# Patient Record
Sex: FEMALE | Employment: FULL TIME | URBAN - METROPOLITAN AREA
[De-identification: names, ages, dates, MRNs, and addresses within clinical notes are randomized per-mention and may not be internally consistent; named-entity substitution may affect disease eponyms.]

---

## 2017-09-21 ENCOUNTER — HOSPITAL ENCOUNTER (OUTPATIENT)
Dept: GENERAL RADIOLOGY | Facility: HOSPITAL | Age: 32
Discharge: HOME | End: 2017-09-21
Attending: OBSTETRICS & GYNECOLOGY
Payer: COMMERCIAL

## 2017-09-21 DIAGNOSIS — N97.9 INFERTILITY, FEMALE: ICD-10-CM

## 2017-09-21 PROCEDURE — 74740 X-RAY FEMALE GENITAL TRACT: CPT | Performed by: OBSTETRICS & GYNECOLOGY

## 2017-09-21 PROCEDURE — 58340 CATHETER FOR HYSTEROGRAPHY: CPT | Performed by: OBSTETRICS & GYNECOLOGY

## 2020-08-18 ENCOUNTER — APPOINTMENT (OUTPATIENT)
Dept: CT IMAGING | Facility: HOSPITAL | Age: 35
DRG: 439 | End: 2020-08-18
Attending: EMERGENCY MEDICINE
Payer: COMMERCIAL

## 2020-08-18 PROBLEM — K85.20 ALCOHOL-INDUCED ACUTE PANCREATITIS: Status: ACTIVE | Noted: 2020-08-18

## 2020-08-18 PROBLEM — K85.20 ALCOHOL-INDUCED ACUTE PANCREATITIS, UNSPECIFIED COMPLICATION STATUS: Status: ACTIVE | Noted: 2020-08-18

## 2020-08-18 PROBLEM — K85.20 ALCOHOL-INDUCED ACUTE PANCREATITIS (HCC): Status: ACTIVE | Noted: 2020-08-18

## 2020-08-18 PROBLEM — K85.20 ALCOHOL-INDUCED ACUTE PANCREATITIS, UNSPECIFIED COMPLICATION STATUS (HCC): Status: ACTIVE | Noted: 2020-08-18

## 2020-08-18 PROCEDURE — 74176 CT ABD & PELVIS W/O CONTRAST: CPT | Performed by: EMERGENCY MEDICINE

## 2020-08-19 NOTE — PLAN OF CARE
Problem: Patient Centered Care  Goal: Patient preferences are identified and integrated in the patient's plan of care  Description  Interventions:  - What would you like us to know as we care for you?  \"I have always drank a lot, but I guess the amount h FALL  Goal: Free from fall injury  Description  INTERVENTIONS:  - Assess pt frequently for physical needs  - Identify cognitive and physical deficits and behaviors that affect risk of falls.   - [place] fall precautions as indicated by assessment.  - Educ and temperature  - Assess for signs of decreased coronary artery perfusion - ex.  Angina  - Evaluate fluid balance, assess for edema, trend weights  Outcome: Progressing  Goal: Absence of cardiac arrhythmias or at baseline  Description  INTERVENTIONS:  - Co adequate hydration with IV or PO as ordered and tolerated  - Evaluate effectiveness of GI medications  - Encourage mobilization and activity  - Obtain nutritional consult as needed  - Establish a toileting routine/schedule  - Consider collaborating with ph remains intact  Description  INTERVENTIONS  - Assess and document risk factors for pressure ulcer development  - Assess and document skin integrity  - Monitor for areas of redness and/or skin breakdown  - Initiate interventions, skin care algorithm/standar neurological status  - Encourage and assist patient to increase activity and self care with guidance from PT/OT  - Encourage visually impaired, hearing impaired and aphasic patients to use assistive/communication devices  Outcome: Progressing     Problem:

## 2020-08-19 NOTE — H&P
[facility] Hospitalist History and Physical      Patient presents with:  Abdomen/Flank Pain       PCP: Juvenal Devine      History of Present Illness: Patient is a 28year old female with no sig pmh presents with a cc of abd pain.   Pain started ystd, it was mostl Septum midline. Mucosa normal. No drainage.    Throat: Lips, mucosa, and tongue normal. Teeth and gums normal.   Neck: Supple, symmetrical, trachea midline, no cervical or supraclavicular lymph adenopathy, thyroid: no enlargment/tenderness/nodules appreciat transmitted to the [facility]) Jerald Issa [address] ([address]), which includes the Dose Index Registry.    FINDINGS: COMMENT: Evaluation of the vasculature and of the abdominal viscera for the presence of intraparenchymal pat a 28year old female with no sig pmh presents with a cc of abd pain. Pain started ystd, it was mostly epigastric, radiated to the back, sharp, severe, w/o clear exacerbating or alleviating factors.     Problem List:  Acute alcoholic pancreatitis   etoh dep

## 2020-08-19 NOTE — CONSULTS
[facility]    Report of Consultation    Tanesha Almeida Patient Status:  Inpatient    2/10/1985 MRN [id number]   Location [facility] 5SW/SE Attending Alejandra Lentz MD   Hosp Day # 1 PCP Eneida Gilmore     Date of Admission:   B-1) tab 100 mg, 100 mg, Oral, Daily  multivitamin with minerals (ADULT) tab 1 tablet, 1 tablet, Oral, Daily  folic acid (FOLVITE) tab 1 mg, 1 mg, Oral, Daily  Ketorolac Tromethamine (TORADOL) injection 15 mg, 15 mg, Intravenous, Q6H PRN  Enoxaparin Sodium 116*   CA 9.0 7.6*   ALB 4.1 2.9*   ALKPHO 90 64   TP 7.8 5.8*   AST 50* 34   ALT 38 25       Recent Labs   Lab 08/18/20  1838 08/19/20  0653   LIP 9,287* 5,367*   ETOH <3  --         Imaging:  Ct Abdomen+pelvis Kidneystone 2d Rndr(no Iv,no Oral)(cpt=74978

## 2020-08-19 NOTE — PAYOR COMM NOTE
--------------  ADMISSION REVIEW     Payor: [address]  Subscriber #:  [id number]  Authorization Number: [id number]     Admit date: 8/18/20  Admit time: 2138       Admitting Physician: Aliya Escalera MD  Attending Physician:  Jamilah Rodgers MD  [facility] rash.   Neurological: Negative for dizziness. Psychiatric/Behavioral: Negative for suicidal ideas. All other systems reviewed and are negative. Positive for stated complaint: abd/back pain  Other systems are as noted in HPI.   Constitutional and vi Value Ref Range    Hold Lavender Auto Resulted    RAINBOW DRAW LIGHT GREEN    Collection Time: 08/18/20  6:38 PM   Result Value Ref Range    Hold Lt Green Auto Resulted    RAINBOW DRAW GOLD    Collection Time: 08/18/20  6:38 PM   Result Value Ref Range Neutrophil % 76.1 %    Lymphocyte % 16.0 %    Monocyte % 6.3 %    Eosinophil % 0.5 %    Basophil % 0.8 %    Immature Granulocyte % 0.3 %   ETHYL ALCOHOL    Collection Time: 08/18/20  6:38 PM   Result Value Ref Range    Ethyl Alcohol <3 <3 mg/dL   [facility] POCT admission  - discussed with Dr. Anne Mahoney - informed him of pt consult - requesting LR @150/hr        Medical Record Review: I personally reviewed available prior medical records for any recent pertinent discharge summaries, testing, and procedures, and review pain/sob/focal neuro deficits. Found to have acute pancreatitis on admit to the hospital - lipase in the 9000's with saran-pancreatic stranding/inflammation on CT.   Patient states that she is a daily drinker - up to 6 hard alcoholic drinks daily, more on t S2 normal, no murmur, rub or gallop appreciated   Abdomen:   Soft, mild ttp - worse in epigastrium - non-peritoneal. Bowel sounds normal. No masses,  No organomegaly. Non distended   Extremities: Extremities normal, atraumatic, no cyanosis or edema.    Skin LIVER: There is mild hepatic steatosis. BILIARY: The gallbladder is nondilated. There is no biliary ductal dilatation.  PANCREAS: There is diffuse peripancreatic fat stranding with edema extending into the right anterior pararenal space and root of the sma imaging  -CIWA  -Vitamins  -seizure precautions  -educated on etoh cessation  -GI on consult    Karen Ho MD  [facility] Hospitalist  [phone]          Electronically signed by Cherylene Shiley, MD on 8/19/2020 10:09 AM         MEDICATIONS ADMINISTERED IN LAST Renu Montgomery RN    8/19/2020 9368 Given 2 mg Intravenous Mindy REAGAN Bran    8/19/2020 0122 Given 2 mg Intravenous Mindy REAGAN Bran    8/18/2020 2239 Given 2 mg Intravenous Mindy Bran RN      morphINE sulfate (PF) 4 MG/ML inject

## 2020-08-19 NOTE — ED PROVIDER NOTES
Patient Seen in: [facility] AND [facility] Emergency Department      History   Patient presents with:  Abdomen/Flank Pain    Stated Complaint: abd/back pain    HPI    66-year-old female with past medical history here with complaints of generalized abdominal pain Temp 97.9 °F (36.6 °C) (Temporal)   Resp 18   Ht 160 cm (5' 3\")   Wt 59 kg   SpO2 98%   BMI 23.03 kg/m²         Physical Exam  Vitals signs and nursing note reviewed.    Constitutional:       Comments: Appears uncomfortable, writhing in cart   HENT: 1.02 mg/dL    BUN/CREA Ratio 8.1 (L) 10.0 - 20.0    Calcium, Total 9.0 8.5 - 10.1 mg/dL    Calculated Osmolality 292 275 - 295 mOsm/kg    GFR, Non- 74 >=60    GFR, -American 85 >=60    ALT 38 13 - 56 U/L    AST 50 (H) 15 - 37 U/L Urine Color Yellow Yellow    Clarity Urine Hazy (A) Clear    Spec Gravity 1.012 1.002 - 1.035    Glucose Urine 50  (A) Negative mg/dL    Bilirubin Urine Negative Negative    Ketones Urine Negative Negative mg/dL    Blood Urine Negative Negative    pH Urine based on the presenting problem including pancreatitis, nephrolithiasis, diverticulitis, perforated appendicitis.       Disposition and Plan     Clinical Impression:  Alcohol-induced acute pancreatitis, unspecified complication status  (primary encounter di

## 2020-08-19 NOTE — BH PROGRESS NOTE
[facility]    Garcia Postin Patient Status:  Inpatient    2/10/1985 MRN [id number]   Location [facility] 5SW/SE Attending Kneny Underwood MD   [facility] Day # 1 PCP Cecy NINO(subjective) \"I've been using alcohol as a way to for LOMG/URVASHI outpatient programming. Psych Liaison will provide additional CADC and LMFT referrals in her discharge instructions. Case discussed with REAGAN Martínez Postal face to face following consult.        Kenna Knutson, [address]  8/19/2020  3:54 PM

## 2020-08-19 NOTE — ED NOTES
Orders for admission, patient is aware of plan and ready to go upstairs. Any questions, please call ED RN uDsty  at extension [phone]. Pt is a&o x4. Ambulatory and independent. Pt has been NPO since arrival to er.

## 2020-08-19 NOTE — ED NOTES
Pt sts, \"I've been drinking Fireball whiskey and maybe a few beers every day. I don't know how many days I've been drinking straight. My last drink was 9pm last night. \"

## 2020-08-19 NOTE — ED NOTES
Pt currently unable to give urine specimen. Pt crying out complaining of severe abdominal pain that wraps around R flank. +N/V. Mother at bedside.

## 2020-08-20 NOTE — PAYOR COMM NOTE
--------------  CONTINUED STAY REVIEW    Payor: [facility]  Subscriber #:  [id number]  Authorization Number: [id number]     Admit date: 8/18/20  Admit time: 2138    Admitting Physician: Golden Wu MD  Attending Physician:  Tc Multani MD  Pr Intravenous, Q8H PRN  Thiamine HCl (Vitamin B-1) tab 100 mg, 100 mg, Oral, Daily  multivitamin with minerals (ADULT) tab 1 tablet, 1 tablet, Oral, Daily  folic acid (FOLVITE) tab 1 mg, 1 mg, Oral, Daily  Ketorolac Tromethamine (TORADOL) injection 15 mg, 15 CREATSERUM 0.99 0.67   BUN 8 8    138   K 3.3* 3.8    107   CO2 20.0* 28.0   * 116*   CA 9.0 7.6*   ALB 4.1 2.9*   ALKPHO 90 64   TP 7.8 5.8*   AST 50* 34   ALT 38 25              Recent Labs   Lab 08/18/20  1838 08/19/20  0653   LIP 9 mucosa, and tongue normal. Teeth and gums normal.   Neck: Supple, symmetrical, trachea midline, no cervical or supraclavicular lymph adenopathy, thyroid: no enlargment/tenderness/nodules appreciated   Lungs:   Clear to auscultation bilaterally.  Normal effo exacerbating or alleviating factors.     Problem List:  Acute alcoholic pancreatitis   etoh dependence  etoh abuse  etoh withdrawal     Plan:  -NPO - diet per GI  -IVF  -Pain control  -monitor labs  -No evidence of necrosis/abscess on imaging - keep a clos Bag (none) Intravenous Mckenzie Garza, REAGAN    8/19/2020 1427 Rate/Dose Change (none) Intravenous Adonay Alberto, RN      LORazepam (ATIVAN) injection 1 mg     Date Action Dose Route User    8/20/2020 2137 Given 1 mg Intravenous Leodan Brown

## 2020-08-20 NOTE — DIETARY NOTE
ADULT NUTRITION INITIAL ASSESSMENT    RECOMMENDATIONS TO MD:  CPM  Recommend diet advancement as medically safe and or Nutrition Support if NPO status >7-10 days. Consider  Mg and Phos level eval, replete if deficient. Discussed with RN.    Pt is at [facility] 8549-2354 calories/day (28- 30 calories per kg Actual body wt (ABW))    Protein: 70-75 g protein/day (1.2-1.3 g protein/kg  Actual body wt (ABW))  - Diet: NPO  - Meals and snacks: NPO  - Medical Food or Oral Nutrition Supplements (ONS) -NPO  - Vitamin and function, labs WNL, maintain true wt within 5% and abstinence from alcohol. RD will follow up.      Milla Newberry, [address] [place] [address], [address] [place] [address]        Clinical Dietitian  [phone]

## 2020-08-20 NOTE — PROGRESS NOTES
[facility] Hospitalist Progress Note                                                                   [address]   2/10/1985    SUBJECTIVE:  Had some severe pain overnight, a little better t No results for input(s): PGLU in the last 168 hours.     Meds:   Scheduled:   • Thiamine HCl  100 mg Oral Daily   • multivitamin with minerals  1 tablet Oral Daily   • folic acid  1 mg Oral Daily   • enoxaparin  40 mg Subcutaneous Daily     Continuous

## 2020-08-20 NOTE — PROGRESS NOTES
[facility] AND [facility]  Gastroenterology Progress Note    Rosales Rosales Patient Status:  Inpatient    2/10/1985 MRN [id number]   Location [facility] 5SW/SE Attending Johann Velarde, [address] [facility] Day # 2 PCP Jamil Amezquita     Subjective:  Mauricio Mills normal.   -hepatitis A and C negative  -hepatitis b surface antibody positive suggestive of previous vaccination and immunity.      3. Alcohol abuse.    -no history of alcohol withdrawal, however she is at higher risk for this.    -no evidence of overt wit

## 2020-08-20 NOTE — PLAN OF CARE
Problem: Patient Centered Care  Goal: Patient preferences are identified and integrated in the patient's plan of care  Description  Interventions:  - What would you like us to know as we care for you?   - Provide timely, complete, and accurate informatio Problem: GASTROINTESTINAL - ADULT  Goal: Minimal or absence of nausea and vomiting  Description  INTERVENTIONS:  - Maintain adequate hydration with IV or PO as ordered and tolerated  - Nasogastric tube to low intermittent suction as ordered  - Evaluate e activity, document and report duration and description of seizure to MD/LIP  - If seizure occurs, turn patient to side and suction secretions as needed  - Reorient patient post seizure  - Seizure pads on all 4 side rails  - Instruct patient/family to notif

## 2020-08-20 NOTE — PLAN OF CARE
Problem: Patient Centered Care  Goal: Patient preferences are identified and integrated in the patient's plan of care  Description  Interventions:  - What would you like us to know as we care for you? I want to be pain free.   - Provide timely, complete, patient/family/discharge partner in discharge planning  - Arrange for needed discharge resources and transportation as appropriate  - Identify discharge learning needs (meds, wound care, etc)  - Arrange for interpreters to assist at discharge as needed  - Position to facilitate oxygenation and minimize respiratory effort  - Oxygen supplementation based on oxygen saturation or ABGs  - Provide Smoking Cessation handout, if applicable  - Encourage broncho-pulmonary hygiene including cough, deep breathe, Incent blood pressure (other measures as available)  - Encourage oral intake as appropriate  - Instruct patient on fluid and nutrition restrictions as appropriate  Outcome: Progressing     Problem: SKIN/TISSUE INTEGRITY - ADULT  Goal: Skin integrity remains intac to call for assistance with activity based on assessment  Outcome: Progressing  Goal: Achieves maximal functionality and self care  Description  INTERVENTIONS  - Monitor swallowing and airway patency with patient fatigue and changes in neurological status

## 2020-08-21 ENCOUNTER — APPOINTMENT (OUTPATIENT)
Dept: CT IMAGING | Facility: HOSPITAL | Age: 35
DRG: 439 | End: 2020-08-21
Attending: HOSPITALIST
Payer: COMMERCIAL

## 2020-08-21 PROCEDURE — 74177 CT ABD & PELVIS W/CONTRAST: CPT | Performed by: HOSPITALIST

## 2020-08-21 NOTE — PLAN OF CARE
Problem: Patient Centered Care  Goal: Patient preferences are identified and integrated in the patient's plan of care  Description  Interventions:  - What would you like us to know as we care for you?  I live at home with my   - Provide timely, com injury  Description  INTERVENTIONS:  - Assess pt frequently for physical needs  - Identify cognitive and physical deficits and behaviors that affect risk of falls.   - [place] fall precautions as indicated by assessment.  - Educate pt/family on patient sa for signs of decreased coronary artery perfusion - ex.  Angina  - Evaluate fluid balance, assess for edema, trend weights  Outcome: Progressing  Goal: Absence of cardiac arrhythmias or at baseline  Description  INTERVENTIONS:  - Continuous cardiac monitorin as ordered and tolerated  - Evaluate effectiveness of GI medications  - Encourage mobilization and activity  - Obtain nutritional consult as needed  - Establish a toileting routine/schedule  - Consider collaborating with pharmacy to review patient's medica intact  Description  INTERVENTIONS  - Assess and document risk factors for pressure ulcer development  - Assess and document skin integrity  - Monitor for areas of redness and/or skin breakdown  - Initiate interventions, skin care algorithm/standards of ca status  - Encourage and assist patient to increase activity and self care with guidance from PT/OT  - Encourage visually impaired, hearing impaired and aphasic patients to use assistive/communication devices  Outcome: Progressing     Problem: ANXIETY  Goal

## 2020-08-21 NOTE — PLAN OF CARE
Problem: Patient Centered Care  Goal: Patient preferences are identified and integrated in the patient's plan of care  Description  Interventions:  - What would you like us to know as we care for you?  I live at home with my   - Provide timely, com injury  Description  INTERVENTIONS:  - Assess pt frequently for physical needs  - Identify cognitive and physical deficits and behaviors that affect risk of falls.   - [place] fall precautions as indicated by assessment.  - Educate pt/family on patient sa for signs of decreased coronary artery perfusion - ex.  Angina  - Evaluate fluid balance, assess for edema, trend weights  Outcome: Progressing  Goal: Absence of cardiac arrhythmias or at baseline  Description  INTERVENTIONS:  - Continuous cardiac monitorin as ordered and tolerated  - Evaluate effectiveness of GI medications  - Encourage mobilization and activity  - Obtain nutritional consult as needed  - Establish a toileting routine/schedule  - Consider collaborating with pharmacy to review patient's medica intact  Description  INTERVENTIONS  - Assess and document risk factors for pressure ulcer development  - Assess and document skin integrity  - Monitor for areas of redness and/or skin breakdown  - Initiate interventions, skin care algorithm/standards of ca status  - Encourage and assist patient to increase activity and self care with guidance from PT/OT  - Encourage visually impaired, hearing impaired and aphasic patients to use assistive/communication devices  Outcome: Progressing     Problem: ANXIETY  Goal

## 2020-08-21 NOTE — PROGRESS NOTES
[facility] AND [facility]  Gastroenterology Progress Note    Mona Schwartz Patient Status:  Inpatient    2/10/1985 MRN [id number]   Location [facility] 5SW/SE Attending Mario Riley, [address] [facility] Day # 3 PCP Isaak Matt     Subjective:  [place]iam pancreatitis  -history of alcohol abuse.   -no obvious stones on noncontrast CT.   -still some pain today, however improved and she is feeling hungry     2.   Abnormal LFTs - resolved  -likely secondary to mild alcohol hepatitis  -now LFTs normal.   -hepati

## 2020-08-21 NOTE — PROGRESS NOTES
[facility] Hospitalist Progress Note                                                                   [address]   2/10/1985    SUBJECTIVE:  Spiked a temp today and persistently tachycardic, --  0.57   CA 9.0 7.6* 7.7*  --  7.3*   MG  --   --  1.4*  --  2.3   PHOS  --   --  1.5* 2.6  --    * 116* 77  --  67*       Recent Labs   Lab 08/18/20  1838 08/19/20  0653   ALT 38 25   AST 50* 34   ALB 4.1 2.9*       Recent Labs   Lab 08/21/20  08

## 2020-08-21 NOTE — PAYOR COMM NOTE
--------------  CONTINUED STAY REVIEW    Payor: [address] [facility]  Subscriber #:  [id number]  Authorization Number: [id number]     Admit date: 8/18/20  Admit time: 2138    FAXING CLINICAL UPDATE FOR 8/21/20 8/21/20   GASTROENTEROLOGY NOTE:  Gerardo Acuña MEDICATIONS ADMINISTERED IN LAST 1 DAY:  dextrose 50 % injection     Date Action Dose Route User    8/21/2020 0845 Given (none) (none) Hector Mendoza RN      Enoxaparin Sodium (LOVENOX) 40 MG/0.4ML injection 40 mg     Date Action Dose Route sodium chloride 0.9% 250 mL IVPB     Date Action Dose Route User    8/20/2020 1614 New Bag 40 mEq Intravenous Viki Jaramillo, REAGAN      multivitamin with minerals (ADULT) tab 1 tablet     Date Action Dose Route User    8/21/2020 7089 Given 1 tablet Or

## 2020-08-22 ENCOUNTER — APPOINTMENT (OUTPATIENT)
Dept: GENERAL RADIOLOGY | Facility: HOSPITAL | Age: 35
DRG: 439 | End: 2020-08-22
Attending: HOSPITALIST
Payer: COMMERCIAL

## 2020-08-22 PROCEDURE — 71045 X-RAY EXAM CHEST 1 VIEW: CPT | Performed by: HOSPITALIST

## 2020-08-22 NOTE — PROGRESS NOTES
[facility]  Gastroenterology Progress Note    Zari Koo Patient Status:  Inpatient    2/10/1985 MRN [id number]   Location [facility] 5SW/SE Attending Jhonny Mera, [address] [facility] Day # 4 PCP Kimberley Chauhan     Subjective:  Juvenal Car collection/pseudocyst. 2. Development of moderate pleural effusions bilaterally with associated passive atelectasis at both lung bases.  3. Mild localized small bowel ileus in the left upper quadrant, new from the prior CT and likely due to the underlying p

## 2020-08-22 NOTE — PROGRESS NOTES
[facility] Hospitalist Progress Note                                                                   [address]   2/10/1985    SUBJECTIVE:  Fever curve improved today, white count stable 08/19/20  8030 08/20/20  0724 08/20/20  2205 08/21/20  0713 08/22/20  0657    138 137  --  138 139   K 3.3* 3.8 3.2* 3.5 3.8 3.2*    107 105  --  108 107   CO2 20.0* 28.0 24.0  --  22.0 28.0   BUN 8 8 6*  --  8 5*   CREATSERUM 0.99 0.67 0.49*

## 2020-08-22 NOTE — PLAN OF CARE
Problem: Patient Centered Care  Goal: Patient preferences are identified and integrated in the patient's plan of care  Description  Interventions:  - What would you like us to know as we care for you?  I live at home with my   - Provide timely, com injury  Description  INTERVENTIONS:  - Assess pt frequently for physical needs  - Identify cognitive and physical deficits and behaviors that affect risk of falls.   - [place] fall precautions as indicated by assessment.  - Educate pt/family on patient sa for signs of decreased coronary artery perfusion - ex.  Angina  - Evaluate fluid balance, assess for edema, trend weights  Outcome: Progressing  Goal: Absence of cardiac arrhythmias or at baseline  Description  INTERVENTIONS:  - Continuous cardiac monitorin as ordered and tolerated  - Evaluate effectiveness of GI medications  - Encourage mobilization and activity  - Obtain nutritional consult as needed  - Establish a toileting routine/schedule  - Consider collaborating with pharmacy to review patient's medica intact  Description  INTERVENTIONS  - Assess and document risk factors for pressure ulcer development  - Assess and document skin integrity  - Monitor for areas of redness and/or skin breakdown  - Initiate interventions, skin care algorithm/standards of ca status  - Encourage and assist patient to increase activity and self care with guidance from PT/OT  - Encourage visually impaired, hearing impaired and aphasic patients to use assistive/communication devices  Outcome: Progressing     Problem: ANXIETY  Goal

## 2020-08-22 NOTE — PLAN OF CARE
Problem: Patient Centered Care  Goal: Patient preferences are identified and integrated in the patient's plan of care  Description  Interventions:  - What would you like us to know as we care for you?  I live at home with my   - Provide timely, com toileting schedule  Outcome: Progressing     Problem: DISCHARGE PLANNING  Goal: Discharge to home or other facility with appropriate resources  Description  INTERVENTIONS:  - Identify barriers to discharge w/pt and caregiver  - Include patient/family/disch ordered  Outcome: Progressing     Problem: RESPIRATORY - ADULT  Goal: Achieves optimal ventilation and oxygenation  Description  INTERVENTIONS:  - Assess for changes in respiratory status  - Assess for changes in mentation and behavior  - Position to facil needed  - Monitor I&O, WT and lab values  - Obtain nutritional consult as needed  - Optimize oral hygiene and moisture  - Encourage food from home; allow for food preferences  - Enhance eating environment  Outcome: Progressing     Problem: METABOLIC/FLUID ADULT  Goal: Achieves stable or improved neurological status  Description  INTERVENTIONS  - Assess for and report changes in neurological status  - Initiate measures to prevent increased intracranial pressure  - Maintain blood pressure and fluid volume wit concerns and demonstrate effective coping strategies  Description  INTERVENTIONS:  - Assist patient/family to identify coping skills, available support systems and cultural and spiritual values  - Provide emotional support, including active listening and a

## 2020-08-23 NOTE — PROGRESS NOTES
[facility]  Gastroenterology Progress Note    Isi Guido Patient Status:  Inpatient    2/10/1985 MRN [id number]   Location [facility] 5SW/SE Attending Lluvia Wolfe, [address] [facility] Day # 5 PCP Leanna Skelton     Subjective:  Rochelle Tsang extending into the root of the small bowel mesentery, bilateral pericolic gutters and pelvis.   No evidence of pancreatic parenchymal necrosis or drainable peripancreatic fluid collection/pseudocyst. 2. Development of moderate pleural effusions bilaterally back.  She is 4 days post admission and thus the IV fluid requirements are less of a concern. 3.  Consider repeat small dose of lasix later today if able to tolerate clears   4. Hopefully will be able to advance diet tomorrow depending on how she does.

## 2020-08-23 NOTE — PLAN OF CARE
Problem: Patient Centered Care  Goal: Patient preferences are identified and integrated in the patient's plan of care  Description  Interventions:  - What would you like us to know as we care for you?  I live at home with my   - Provide timely, com injury  Description  INTERVENTIONS:  - Assess pt frequently for physical needs  - Identify cognitive and physical deficits and behaviors that affect risk of falls.   - [place] fall precautions as indicated by assessment.  - Educate pt/family on patient sa for signs of decreased coronary artery perfusion - ex.  Angina  - Evaluate fluid balance, assess for edema, trend weights  Outcome: Progressing  Goal: Absence of cardiac arrhythmias or at baseline  Description  INTERVENTIONS:  - Continuous cardiac monitorin as ordered and tolerated  - Evaluate effectiveness of GI medications  - Encourage mobilization and activity  - Obtain nutritional consult as needed  - Establish a toileting routine/schedule  - Consider collaborating with pharmacy to review patient's medica intact  Description  INTERVENTIONS  - Assess and document risk factors for pressure ulcer development  - Assess and document skin integrity  - Monitor for areas of redness and/or skin breakdown  - Initiate interventions, skin care algorithm/standards of ca status  - Encourage and assist patient to increase activity and self care with guidance from PT/OT  - Encourage visually impaired, hearing impaired and aphasic patients to use assistive/communication devices  Outcome: Progressing     Problem: ANXIETY  Goal patient's desats and prior interventions from earlier. MD aware, said it was fine to check the CXR in the am, no new orders. VSS on 2L 95%. Will continue to monitor.

## 2020-08-23 NOTE — PROGRESS NOTES
[facility] Hospitalist Progress Note                                                                   [address]   2/10/1985    SUBJECTIVE:  Spiked a temp last night, blood cultures drawn an 273.0       Recent Labs   Lab 08/19/20  0653 08/20/20  0724 08/20/20  2205 08/21/20  0713 08/22/20  0657 08/23/20  0701    137  --  138 139 141   K 3.8 3.2* 3.5 3.8 3.2* 3.0*    105  --  108 107 105   CO2 28.0 24.0  --  22.0 28.0 29.0   BUN 8 6 resolved  -CXR w/o infiltrate  -monitor off abx for now    Pleural Effusions:  -consider stopping IVF if worsening on cxr and giving diuresis if able to tolerate po    Macrocytic anemia:  -Likely 2/2 etohism  -check vit b12, tsh  -check folate as op as can

## 2020-08-24 NOTE — PAYOR COMM NOTE
--------------  CONTINUED STAY REVIEW    Payor: [address] [facility]  Subscriber #:  [id number]  Authorization Number: [id number]     Admit date: 8/18/20  Admit time: 2138    FAXING CLINICAL UPDATE FOR 8/22/20-8/23/20 8/22/20  SUBJECTIVE:  Fever curv 08/21/20  0713 08/22/20  0657    138 137  --  138 139   K 3.3* 3.8 3.2* 3.5 3.8 3.2*    107 105  --  108 107   CO2 20.0* 28.0 24.0  --  22.0 28.0   BUN 8 8 6*  --  8 5*   CREATSERUM 0.99 0.67 0.49*  --  0.57 0.63   CA 9.0 7.6* 7.7*  --  7.3* 8. --  7.3* 8.2* 8.2*   ALB 4.1 2.9*  --   --   --   --   --    ALKPHO 90 64  --   --   --   --   --    TP 7.8 5.8*  --   --   --   --   --    AST 50* 34  --   --   --   --   --    ALT 38 25  --   --   --   --   --    MG  --   --  1.4*  --  2.3  --   --    PH appreciated   Lungs:   Clear to auscultation bilaterally. Normal effort   Chest wall:  No tenderness or deformity.    Heart:  Regular rate and rhythm, S1, S2 normal, no murmur, rub or gallop appreciated   Abdomen:   Soft, mild ttp - worse in epigastrium - n ADMINISTERED IN LAST 1 DAY:  acetaminophen (TYLENOL) tab 650 mg     Date Action Dose Route User    8/24/2020 0410 Given 650 mg Oral Zilphia REAGAN Mendoza    8/23/2020 2208 Given 650 mg Oral Zilphia REAGAN Mendoza      Enoxaparin Sodium (LOVENOX) 40 MG/0.4ML i Action Dose Route User    8/24/2020 0809 Given 100 mg Oral Ronaldo Courtney, RN

## 2020-08-24 NOTE — PLAN OF CARE
Problem: Patient Centered Care  Goal: Patient preferences are identified and integrated in the patient's plan of care  Description  Interventions:  - What would you like us to know as we care for you?  I live at home with my   - Provide timely, com puncture sites for bleeding and/or hematoma  - Assess quality of pulses, skin color and temperature  - Assess for signs of decreased coronary artery perfusion - ex.  Angina  - Evaluate fluid balance, assess for edema, trend weights  Outcome: Progressing  Go bowel function  Description  INTERVENTIONS:  - Assess bowel function  - Maintain adequate hydration with IV or PO as ordered and tolerated  - Evaluate effectiveness of GI medications  - Encourage mobilization and activity  - Obtain nutritional consult as n Assess oral mucosa and hygiene practices  - Implement preventative oral hygiene regimen  - Implement oral medicated treatments as ordered  Outcome: Progressing     Problem: NEUROLOGICAL - ADULT  Goal: Achieves stable or improved neurological status  Descri Teach and rehearse alternative coping skills  - Provide emotional support with 1:1 interaction with staff  Outcome: Progressing     Problem: COPING  Goal: Pt/Family able to verbalize concerns and demonstrate effective coping strategies  Description  INTERV Notify MD/LIP if interventions unsuccessful or patient reports new pain  - Anticipate increased pain with activity and pre-medicate as appropriate  Outcome: Not Progressing     Problem: DISCHARGE PLANNING  Goal: Discharge to home or other facility with sanna

## 2020-08-24 NOTE — PROGRESS NOTES
[facility] Hospitalist Progress Note                                                                   [address]   2/10/1985    SUBJECTIVE:  Afebrile, has back pain    OBJECTIVE:  Temp:  Suman --  7.3* 8.2* 8.2* 8.9   MG 1.4*  --  2.3  --   --   --    PHOS 1.5* 2.6  --   --   --   --    GLU 77  --  67* 103* 107* 87       Recent Labs   Lab 08/18/20  1838 08/19/20  0653   ALT 38 25   AST 50* 34   ALB 4.1 2.9*       Recent Labs   Lab 08/21/20  0857

## 2020-08-24 NOTE — PLAN OF CARE
Problem: Patient Centered Care  Goal: Patient preferences are identified and integrated in the patient's plan of care  Description  Interventions:  - What would you like us to know as we care for you?  I live at home with my   - Provide timely, com injury  Description  INTERVENTIONS:  - Assess pt frequently for physical needs  - Identify cognitive and physical deficits and behaviors that affect risk of falls.   - [place] fall precautions as indicated by assessment.  - Educate pt/family on patient sa for signs of decreased coronary artery perfusion - ex.  Angina  - Evaluate fluid balance, assess for edema, trend weights  Outcome: Progressing  Goal: Absence of cardiac arrhythmias or at baseline  Description  INTERVENTIONS:  - Continuous cardiac monitorin as ordered and tolerated  - Evaluate effectiveness of GI medications  - Encourage mobilization and activity  - Obtain nutritional consult as needed  - Establish a toileting routine/schedule  - Consider collaborating with pharmacy to review patient's medica intact  Description  INTERVENTIONS  - Assess and document risk factors for pressure ulcer development  - Assess and document skin integrity  - Monitor for areas of redness and/or skin breakdown  - Initiate interventions, skin care algorithm/standards of ca status  - Encourage and assist patient to increase activity and self care with guidance from PT/OT  - Encourage visually impaired, hearing impaired and aphasic patients to use assistive/communication devices  Outcome: Progressing     Problem: ANXIETY  Goal

## 2020-08-24 NOTE — PROGRESS NOTES
GI  PROGRESS NOTE    SUBJECTIVE: tolerating clear liquids; ambulated in hallways today.  Aj Presley) at bedside.      OBJECTIVE:  Temp:  [98.7 °F (37.1 °C)-99.7 °F (37.6 °C)] 98.7 °F (37.1 °C)  Pulse:  [] 113  Resp:  [18-20] 18  BP: (119-135)/(60 necrosis or drainable peripancreatic fluid collection/pseudocyst. 2. Development of moderate pleural effusions bilaterally with associated passive atelectasis at both lung bases.  3. Mild localized small bowel ileus in the left upper quadrant, new from the

## 2020-08-25 NOTE — PLAN OF CARE
Problem: Patient Centered Care  Goal: Patient preferences are identified and integrated in the patient's plan of care  Description  Interventions:  - What would you like us to know as we care for you?  I live at home with my   - Provide timely, com injury  Description  INTERVENTIONS:  - Assess pt frequently for physical needs  - Identify cognitive and physical deficits and behaviors that affect risk of falls.   - [place] fall precautions as indicated by assessment.  - Educate pt/family on patient sa for signs of decreased coronary artery perfusion - ex.  Angina  - Evaluate fluid balance, assess for edema, trend weights  Outcome: Progressing  Goal: Absence of cardiac arrhythmias or at baseline  Description  INTERVENTIONS:  - Continuous cardiac monitorin as ordered and tolerated  - Evaluate effectiveness of GI medications  - Encourage mobilization and activity  - Obtain nutritional consult as needed  - Establish a toileting routine/schedule  - Consider collaborating with pharmacy to review patient's medica intact  Description  INTERVENTIONS  - Assess and document risk factors for pressure ulcer development  - Assess and document skin integrity  - Monitor for areas of redness and/or skin breakdown  - Initiate interventions, skin care algorithm/standards of ca status  - Encourage and assist patient to increase activity and self care with guidance from PT/OT  - Encourage visually impaired, hearing impaired and aphasic patients to use assistive/communication devices  Outcome: Progressing     Problem: ANXIETY  Goal

## 2020-08-25 NOTE — PROGRESS NOTES
[facility] Hospitalist Progress Note                                                                   [address]  2/10/1985    SUBJECTIVE:  Having pain but tolerating cld    OBJECTIVE:  Tem 1.4*  --  2.3  --   --   --   --    PHOS 1.5* 2.6  --   --   --   --   --    GLU 77  --  67* 103* 107* 87  --        Recent Labs   Lab 08/18/20  1838 08/19/20  0653   ALT 38 25   AST 50* 34   ALB 4.1 2.9*       Recent Labs   Lab 08/21/20  0857   PGLU 193*

## 2020-08-25 NOTE — PROGRESS NOTES
GI  PROGRESS NOTE    SUBJECTIVE: tolerating full liquids; ambulated in hallways today.  Luis M Major) at bedside.      OBJECTIVE:  Temp:  [98 °F (36.7 °C)-99.3 °F (37.4 °C)] 98 °F (36.7 °C)  Pulse:  [] 104  Resp:  [18] 18  BP: (103-122)/(54-90) 122 parenchymal necrosis or drainable peripancreatic fluid collection/pseudocyst. 2. Development of moderate pleural effusions bilaterally with associated passive atelectasis at both lung bases.  3. Mild localized small bowel ileus in the left upper quadrant, n

## 2020-08-25 NOTE — PAYOR COMM NOTE
--------------  CONTINUED STAY REVIEW    Payor: [facility]  Subscriber #:  [id number]  Authorization Number: [id number]     Admit date: 8/18/20  Admit time: 2138    Admitting Physician: Johann Velarde MD  Attending Physician:  Bethanie Velazquez MD 08/21/20  0713 08/22/20  0657 08/23/20  0701 08/24/20  0805 08/24/20  2220     --  138 139 141 141  --    K 3.2* 3.5 3.8 3.2* 3.0* 3.2*  3.2* 4.0     --  108 107 105 106  --    CO2 24.0  --  22.0 28.0 29.0 29.0  --    BUN 6*  --  8 5* 3* 4*  -- Hospitalist  Pager: [phone]            Electronically signed by Christie Rodriguez MD at 8/25/2020 10:15 AM            MEDICATIONS ADMINISTERED IN LAST 1 DAY:  Enoxaparin Sodium (LOVENOX) 40 MG/0.4ML injection 40 mg     Date Action Dose Route User    8/24/2 Heart with regular rate and rhythm, no peripheral edema  Abd: Abdomen soft, mild diffuse tender w/o allie; (+ occ) BS.      Labs:              Recent Labs   Lab 08/20/20  0724 08/21/20  0713 08/22/20  0657 08/23/20  0701 08/24/20  0805   WBC 12.6* 13.2* 13.6 colonic diverticulosis. 5. Stable mild hepatic steatosis.     _______________________________________________________________     IMPRESSION: Pt is a 28 yr F admitted 8/18/20 secondary to alcohol induced pancreatitis.  Clinically stable/sl improving.      P

## 2020-08-26 NOTE — DIETARY NOTE
ADULT NUTRITION REASSESSMENT     RECOMMENDATIONS TO MD:  None at this time    Pt is at moderate nutrition risk. Pt does not meet malnutrition criteria.       NUTRITION DIAGNOSIS/PROBLEM:  Inadequate oral intake related to altered GI function due to acute p when pt was on cl liq). Lasix and IV fluids d/c on 8/24. Pt requested diet education and hand out on pancreatitis diet. RD accommodated.      NUTRITION INTERVENTION:    NUTRITION PRESCRIPTION:   Estimated Nutrition needs: --dosing wt of 58 kg  Calories: 160 on full liq but pt ordering 1-2 food items at a time.    Percent Meals Eaten (last 3 days)     Date/Time Percent Meals Eaten (%)    08/24/20 1121  75 %    Percent Meals Eaten (%): broth at 08/24/20 1121    08/24/20 1400  75 %    08/24/20 1800  100 %    08/2

## 2020-08-26 NOTE — CM/SW NOTE
Interdisciplinary Rounds: 08/26/20  Admitted: 8/18/2020 LOS: 8  Disciplines in attendance: charge nurse, staff nurse, CM, [address] and discharge plan reviewed.     Active issues needing resolution prior to discharge:     Slowly resolving pancreatiti

## 2020-08-26 NOTE — PLAN OF CARE
Problem: Patient Centered Care  Goal: Patient preferences are identified and integrated in the patient's plan of care  Description  Interventions:  - What would you like us to know as we care for you?  I live at home with my   - Provide timely, com RN  Outcome: Adequate for Discharge     Problem: DISCHARGE PLANNING  Goal: Discharge to home or other facility with appropriate resources  Description  INTERVENTIONS:  - Identify barriers to discharge w/pt and caregiver  - Include patient/family/discharge Long Gandhi RN  Outcome: Adequate for Discharge     Problem: RESPIRATORY - ADULT  Goal: Achieves optimal ventilation and oxygenation  Description  INTERVENTIONS:  - Assess for changes in respiratory status  - Assess for changes in mentation and behavior (undernourished)  Description  INTERVENTIONS:  - Monitor percentage of each meal consumed  - Identify factors contributing to decreased intake, treat as appropriate  - Assist with meals as needed  - Monitor I&O, WT and lab values  - Obtain nutritional cons Problem: SKIN/TISSUE INTEGRITY - ADULT  Goal: Oral mucous membranes remain intact  Description  INTERVENTIONS  - Assess oral mucosa and hygiene practices  - Implement preventative oral hygiene regimen  - Implement oral medicated treatments as ordered   8 PT/OT  - Encourage visually impaired, hearing impaired and aphasic patients to use assistive/communication devices   8/26/2020 1708 by Tyler Fisher RN  Outcome: Adequate for Discharge     Problem: ANXIETY  Goal: Will report anxiety at manageable levels  Humberto Isaacs

## 2020-08-26 NOTE — PLAN OF CARE
Patient still with c/o bilateral flank pain-Norco given with some relief; diet advanced to full liquid, tolerating moderately well; possible DC home today; still with loose, green stools-patient to have follow-up as OP(colonoscopy and EGD).    Problem: [place] effects  - Notify MD/LIP if interventions unsuccessful or patient reports new pain  - Anticipate increased pain with activity and pre-medicate as appropriate  Outcome: Progressing     Problem: SAFETY ADULT - FALL  Goal: Free from fall injury  Description output  - Evaluate effectiveness of vasoactive medications to optimize hemodynamic stability  - Assess quality of pulses, skin color and temperature  - Evaluate fluid balance, assess for edema, trend weights   Outcome: Progressing  Goal: Absence of cardiac adequate hydration with IV or PO as ordered and tolerated  - Evaluate effectiveness of GI medications  - Encourage mobilization and activity  - Obtain nutritional consult as needed  - Establish a toileting routine/schedule  - Consider collaborating with ph redness and/or skin breakdown  - Initiate interventions, skin care algorithm/standards of care as needed  Outcome: Progressing  Goal: Oral mucous membranes remain intact  Description  INTERVENTIONS  - Assess oral mucosa and hygiene practices  - Implement p levels  Description  INTERVENTIONS:  - Administer medication as ordered  - Teach and rehearse alternative coping skills  - Provide emotional support with 1:1 interaction with staff  Outcome: Progressing     Problem: COPING  Goal: Pt/Family able to [employer]

## 2020-08-27 NOTE — PAYOR COMM NOTE
--------------  DISCHARGE REVIEW    Payor: [address]  Subscriber #:  [id number]  Authorization Number: [id number]     Admit date: 8/18/20  Admit time:  2138  Discharge Date: 8/26/2020  5:42 PM     Admitting Physician: Libertad Orourke MD  Attend

## 2020-08-30 NOTE — DISCHARGE SUMMARY
General Medicine Discharge Summary     Patient ID:  Palmira Taylor  28year old  2/10/1985    Admit date: 8/18/2020    Discharge date and time: 8/26/2020  5:42 PM     Attending Edwina Omer

## 2023-08-10 ENCOUNTER — APPOINTMENT (OUTPATIENT)
Dept: CT IMAGING | Facility: HOSPITAL | Age: 38
End: 2023-08-10
Attending: EMERGENCY MEDICINE
Payer: COMMERCIAL

## 2023-08-10 ENCOUNTER — HOSPITAL ENCOUNTER (OUTPATIENT)
Age: 38
Discharge: ANOTHER HOSPITAL | End: 2023-08-10
Payer: COMMERCIAL

## 2023-08-10 ENCOUNTER — HOSPITAL ENCOUNTER (INPATIENT)
Facility: HOSPITAL | Age: 38
LOS: 2 days | Discharge: HOME | End: 2023-08-12
Attending: EMERGENCY MEDICINE | Admitting: HOSPITALIST
Payer: COMMERCIAL

## 2023-08-10 VITALS
DIASTOLIC BLOOD PRESSURE: 87 MMHG | SYSTOLIC BLOOD PRESSURE: 138 MMHG | OXYGEN SATURATION: 99 % | HEART RATE: 101 BPM | TEMPERATURE: 98 F | RESPIRATION RATE: 20 BRPM

## 2023-08-10 DIAGNOSIS — R10.10 UPPER ABDOMINAL PAIN: Primary | ICD-10-CM

## 2023-08-10 DIAGNOSIS — K85.90 ACUTE PANCREATITIS, UNSPECIFIED COMPLICATION STATUS, UNSPECIFIED PANCREATITIS TYPE: Primary | ICD-10-CM

## 2023-08-10 LAB
ALBUMIN SERPL-MCNC: 4.5 G/DL (ref 3.4–5)
ALBUMIN/GLOB SERPL: 1.2 {RATIO} (ref 1–2)
ALP LIVER SERPL-CCNC: 67 U/L
ALT SERPL-CCNC: 38 U/L
ANION GAP SERPL CALC-SCNC: 6 MMOL/L (ref 0–18)
AST SERPL-CCNC: 22 U/L (ref 15–37)
B-HCG UR QL: NEGATIVE
BASOPHILS # BLD AUTO: 0.05 X10(3) UL (ref 0–0.2)
BASOPHILS NFR BLD AUTO: 0.3 %
BILIRUB SERPL-MCNC: 0.6 MG/DL (ref 0.1–2)
BILIRUB UR QL: NEGATIVE
BUN BLD-MCNC: 6 MG/DL (ref 7–18)
BUN/CREAT SERPL: 7.5 (ref 10–20)
CALCIUM BLD-MCNC: 9.6 MG/DL (ref 8.5–10.1)
CHLORIDE SERPL-SCNC: 105 MMOL/L (ref 98–112)
CO2 SERPL-SCNC: 26 MMOL/L (ref 21–32)
COLOR UR: YELLOW
CREAT BLD-MCNC: 0.8 MG/DL
DEPRECATED RDW RBC AUTO: 42.1 FL (ref 35.1–46.3)
EGFRCR SERPLBLD CKD-EPI 2021: 97 ML/MIN/1.73M2 (ref 60–?)
EOSINOPHIL # BLD AUTO: 0 X10(3) UL (ref 0–0.7)
EOSINOPHIL NFR BLD AUTO: 0 %
ERYTHROCYTE [DISTWIDTH] IN BLOOD BY AUTOMATED COUNT: 12 % (ref 11–15)
ETHANOL SERPL-MCNC: <3 MG/DL (ref ?–3)
GLOBULIN PLAS-MCNC: 3.9 G/DL (ref 2.8–4.4)
GLUCOSE BLD-MCNC: 130 MG/DL (ref 70–99)
GLUCOSE UR-MCNC: NORMAL MG/DL
HCT VFR BLD AUTO: 46.5 %
HGB BLD-MCNC: 15.7 G/DL
HYALINE CASTS #/AREA URNS AUTO: PRESENT /LPF
IMM GRANULOCYTES # BLD AUTO: 0.07 X10(3) UL (ref 0–1)
IMM GRANULOCYTES NFR BLD: 0.4 %
KETONES UR-MCNC: 150 MG/DL
LEUKOCYTE ESTERASE UR QL STRIP.AUTO: NEGATIVE
LIPASE SERPL-CCNC: 816 U/L (ref 13–75)
LYMPHOCYTES # BLD AUTO: 0.78 X10(3) UL (ref 1–4)
LYMPHOCYTES NFR BLD AUTO: 4.3 %
MCH RBC QN AUTO: 31.9 PG (ref 26–34)
MCHC RBC AUTO-ENTMCNC: 33.8 G/DL (ref 31–37)
MCV RBC AUTO: 94.5 FL
MONOCYTES # BLD AUTO: 0.66 X10(3) UL (ref 0.1–1)
MONOCYTES NFR BLD AUTO: 3.6 %
NEUTROPHILS # BLD AUTO: 16.53 X10 (3) UL (ref 1.5–7.7)
NEUTROPHILS # BLD AUTO: 16.53 X10(3) UL (ref 1.5–7.7)
NEUTROPHILS NFR BLD AUTO: 91.4 %
NITRITE UR QL STRIP.AUTO: NEGATIVE
OSMOLALITY SERPL CALC.SUM OF ELEC: 283 MOSM/KG (ref 275–295)
PH UR: 6 [PH] (ref 5–8)
PLATELET # BLD AUTO: 348 10(3)UL (ref 150–450)
POTASSIUM SERPL-SCNC: 3.9 MMOL/L (ref 3.5–5.1)
PROT SERPL-MCNC: 8.4 G/DL (ref 6.4–8.2)
PROT UR-MCNC: 30 MG/DL
RBC # BLD AUTO: 4.92 X10(6)UL
SODIUM SERPL-SCNC: 137 MMOL/L (ref 136–145)
SP GR UR STRIP: 1.02 (ref 1–1.03)
TRIGL SERPL-MCNC: 142 MG/DL (ref 30–149)
UROBILINOGEN UR STRIP-ACNC: NORMAL
WBC # BLD AUTO: 18.1 X10(3) UL (ref 4–11)

## 2023-08-10 PROCEDURE — 99222 1ST HOSP IP/OBS MODERATE 55: CPT | Performed by: HOSPITALIST

## 2023-08-10 PROCEDURE — 99215 OFFICE O/P EST HI 40 MIN: CPT | Performed by: NURSE PRACTITIONER

## 2023-08-10 PROCEDURE — 74177 CT ABD & PELVIS W/CONTRAST: CPT | Performed by: EMERGENCY MEDICINE

## 2023-08-10 RX ORDER — ACETAMINOPHEN 500 MG
500 TABLET ORAL EVERY 4 HOURS PRN
Status: DISCONTINUED | OUTPATIENT
Start: 2023-08-10 | End: 2023-08-12

## 2023-08-10 RX ORDER — SODIUM CHLORIDE, SODIUM LACTATE, POTASSIUM CHLORIDE, CALCIUM CHLORIDE 600; 310; 30; 20 MG/100ML; MG/100ML; MG/100ML; MG/100ML
INJECTION, SOLUTION INTRAVENOUS CONTINUOUS
Status: DISCONTINUED | OUTPATIENT
Start: 2023-08-10 | End: 2023-08-12

## 2023-08-10 RX ORDER — ONDANSETRON 2 MG/ML
4 INJECTION INTRAMUSCULAR; INTRAVENOUS EVERY 6 HOURS PRN
Status: DISCONTINUED | OUTPATIENT
Start: 2023-08-10 | End: 2023-08-10

## 2023-08-10 RX ORDER — HEPARIN SODIUM 5000 [USP'U]/ML
5000 INJECTION, SOLUTION INTRAVENOUS; SUBCUTANEOUS EVERY 12 HOURS SCHEDULED
Status: DISCONTINUED | OUTPATIENT
Start: 2023-08-10 | End: 2023-08-12

## 2023-08-10 RX ORDER — MORPHINE SULFATE 4 MG/ML
4 INJECTION, SOLUTION INTRAMUSCULAR; INTRAVENOUS ONCE
Status: COMPLETED | OUTPATIENT
Start: 2023-08-10 | End: 2023-08-10

## 2023-08-10 RX ORDER — TEMAZEPAM 15 MG/1
15 CAPSULE ORAL NIGHTLY PRN
Status: DISCONTINUED | OUTPATIENT
Start: 2023-08-10 | End: 2023-08-12

## 2023-08-10 RX ORDER — ONDANSETRON 2 MG/ML
4 INJECTION INTRAMUSCULAR; INTRAVENOUS EVERY 6 HOURS PRN
Status: DISCONTINUED | OUTPATIENT
Start: 2023-08-10 | End: 2023-08-12

## 2023-08-10 RX ORDER — MORPHINE SULFATE 2 MG/ML
1 INJECTION, SOLUTION INTRAMUSCULAR; INTRAVENOUS EVERY 2 HOUR PRN
Status: DISCONTINUED | OUTPATIENT
Start: 2023-08-10 | End: 2023-08-12

## 2023-08-10 RX ORDER — ONDANSETRON 2 MG/ML
4 INJECTION INTRAMUSCULAR; INTRAVENOUS ONCE
Status: COMPLETED | OUTPATIENT
Start: 2023-08-10 | End: 2023-08-10

## 2023-08-10 RX ORDER — MORPHINE SULFATE 2 MG/ML
2 INJECTION, SOLUTION INTRAMUSCULAR; INTRAVENOUS EVERY 2 HOUR PRN
Status: DISCONTINUED | OUTPATIENT
Start: 2023-08-10 | End: 2023-08-12

## 2023-08-10 RX ORDER — MORPHINE SULFATE 4 MG/ML
4 INJECTION, SOLUTION INTRAMUSCULAR; INTRAVENOUS EVERY 2 HOUR PRN
Status: DISCONTINUED | OUTPATIENT
Start: 2023-08-10 | End: 2023-08-12

## 2023-08-10 RX ORDER — PROCHLORPERAZINE EDISYLATE 5 MG/ML
5 INJECTION INTRAMUSCULAR; INTRAVENOUS EVERY 8 HOURS PRN
Status: DISCONTINUED | OUTPATIENT
Start: 2023-08-10 | End: 2023-08-12

## 2023-08-10 NOTE — ED INITIAL ASSESSMENT (HPI)
Pt was sent over from [facility] for abdominal pain and possible acute pancreatitis. Pt reports abdominal and back pain that started on Monday. Pt reports vomiting since yesterday. Pt reports 9/10 pain.

## 2023-08-10 NOTE — ED QUICK NOTES
Orders for admission. Patient and/or next of kin aware of plan and is ready to go upstairs. Please call ED RN Hubert Ngo at listed extension should you have any further questions or concerns. Thank you. Nurse and Jhoana Shearer RN, [address] [facility]    Chief Presentation: ABDOMINAL PAIN    Admission Diagnosis: ACUTE PANCREATITIS    Admitting/Attending Physician: Dominga Sanchez; ALI (GI)    Neuro: A&OX3    Cardiac: SR    Resp: ROOM AIR    GI: EPIGASTRIC PAIN, VOMITING    : WNL    Skin/IV: R FA (20) PATENT AND INFUSING NS BOLUS.     Other Pertinent Information: N/A    Type of COVID Test Sent: N/A    COVID Level of Suspicion: LOW    PRIMARY CARE PARTNER:

## 2023-08-10 NOTE — H&P
[facility]    PATIENT'S NAME: Christopher Ramos   ATTENDING PHYSICIAN: Khushi Salas MD   PATIENT ACCOUNT#:   [id number]    LOCATION:  [address] [place] [address] [address] RECORD #:   [id number]       YOB: 1985  ADMISSION DATE:       08/10/2023    HISTORY AND PHYSICAL EXAMINATION    CHIEF COMPLAINT:  Alcohol-induced pancreatitis. HISTORY OF PRESENT ILLNESS:  The patient is a 43-year-old  female who presented to the emergency department for evaluation of intense mid abdominal pain for the last 3 days. CBC showed white blood cell count of 18.1 with left shift. Chemistry and liver function tests were unremarkable. Triglycerides 142. Lipase 816. CT scan of the abdomen showed acute pancreatitis without peripancreatic collection or pancreatic ductal dilation. The patient was started on IV fluids and pain medication. She will be admitted to the hospital for further management. PAST MEDICAL HISTORY:  Alcohol-induced pancreatitis, hospitalized in 2020. The patient denies any other medical problems. PAST SURGICAL HISTORY:  None. MEDICATIONS:  Currently none. ALLERGIES:  No known drug allergies. FAMILY HISTORY:  Positive for alcoholism. SOCIAL HISTORY:  Heavy alcohol intake in a binge fashion over the weekends mainly. The patient does not smoke or use drugs. REVIEW OF SYSTEMS:  The patient said that she was binge drinking over the weekend at the music festival.  She drank almost 30 beers a day on Saturday and Sunday. On Monday morning started developing mid abdominal pain radiating to back associated with recurrent nausea, vomiting, and poor appetite. Other 12-point review of systems negative. PHYSICAL EXAMINATION:    GENERAL:  Alert. Oriented to time, place, and person. Moderate distress. VITAL SIGNS:  Temperature 98.0, pulse 81, respiratory rate 16, blood pressure 125/92, pulse ox 97% on room air. HEENT:  Atraumatic.   Oropharynx clear, moist mucous membranes. Normal hard and soft palate. Eyes:  Anicteric sclerae. NECK:  Supple. No lymphadenopathy. Trachea midline. Full range of motion. LUNGS:  Clear to auscultation bilaterally. Normal respiratory effort. HEART:  Regular rate and rhythm. S1, S2 auscultated. No murmur. ABDOMEN:  Mild distention. Tenderness to palpation. No guarding. EXTREMITIES:  No peripheral edema, clubbing, or cyanosis. NEUROLOGIC:  Motor and sensory intact. ASSESSMENT AND PLAN:  Acute alcohol-induced pancreatitis. The patient will be admitted to general medical floor. IV fluids, n.p.o. except sips of clear liquids, pain and nausea control, monitor her lipase level, gastroenterology consult. As of now, there is no evidence of necrotic pancreatitis. Further recommendations to follow.     Dictated By Lisa Serrano MD  d: 08/10/2023 12:18:25  t: 08/10/2023 12:36:23  Job [id number]  FB/

## 2023-08-11 PROBLEM — F39 EPISODIC MOOD DISORDER: Status: ACTIVE | Noted: 2023-08-11

## 2023-08-11 PROBLEM — F39 EPISODIC MOOD DISORDER (HCC): Status: ACTIVE | Noted: 2023-08-11

## 2023-08-11 PROBLEM — F10.939 ALCOHOL WITHDRAWAL SYNDROME WITH COMPLICATION (HCC): Status: ACTIVE | Noted: 2023-08-11

## 2023-08-11 PROBLEM — F10.20 ALCOHOL DEPENDENCE, CONTINUOUS (HCC): Status: ACTIVE | Noted: 2023-08-11

## 2023-08-11 LAB
ALBUMIN SERPL-MCNC: 3.2 G/DL (ref 3.4–5)
ALBUMIN/GLOB SERPL: 1.1 {RATIO} (ref 1–2)
ALP LIVER SERPL-CCNC: 51 U/L
ALT SERPL-CCNC: 25 U/L
AMPHET UR QL SCN: NEGATIVE
ANION GAP SERPL CALC-SCNC: 4 MMOL/L (ref 0–18)
AST SERPL-CCNC: 12 U/L (ref 15–37)
BARBITURATES UR QL SCN: NEGATIVE
BASOPHILS # BLD AUTO: 0.05 X10(3) UL (ref 0–0.2)
BASOPHILS NFR BLD AUTO: 0.4 %
BENZODIAZ UR QL SCN: NEGATIVE
BILIRUB SERPL-MCNC: 0.7 MG/DL (ref 0.1–2)
BUN BLD-MCNC: 3 MG/DL (ref 7–18)
BUN/CREAT SERPL: 5 (ref 10–20)
CALCIUM BLD-MCNC: 8.4 MG/DL (ref 8.5–10.1)
CANNABINOIDS UR QL SCN: NEGATIVE
CHLORIDE SERPL-SCNC: 108 MMOL/L (ref 98–112)
CO2 SERPL-SCNC: 29 MMOL/L (ref 21–32)
COCAINE UR QL: NEGATIVE
CREAT BLD-MCNC: 0.6 MG/DL
CREAT UR-SCNC: <13 MG/DL
DEPRECATED RDW RBC AUTO: 42.4 FL (ref 35.1–46.3)
EGFRCR SERPLBLD CKD-EPI 2021: 118 ML/MIN/1.73M2 (ref 60–?)
EOSINOPHIL # BLD AUTO: 0.1 X10(3) UL (ref 0–0.7)
EOSINOPHIL NFR BLD AUTO: 0.8 %
ERYTHROCYTE [DISTWIDTH] IN BLOOD BY AUTOMATED COUNT: 11.9 % (ref 11–15)
GLOBULIN PLAS-MCNC: 3 G/DL (ref 2.8–4.4)
GLUCOSE BLD-MCNC: 97 MG/DL (ref 70–99)
HCT VFR BLD AUTO: 38.2 %
HGB BLD-MCNC: 12.7 G/DL
IMM GRANULOCYTES # BLD AUTO: 0.06 X10(3) UL (ref 0–1)
IMM GRANULOCYTES NFR BLD: 0.5 %
LIPASE SERPL-CCNC: 141 U/L (ref 13–75)
LYMPHOCYTES # BLD AUTO: 1.76 X10(3) UL (ref 1–4)
LYMPHOCYTES NFR BLD AUTO: 13.3 %
MAGNESIUM SERPL-MCNC: 1.7 MG/DL (ref 1.6–2.6)
MCH RBC QN AUTO: 31.9 PG (ref 26–34)
MCHC RBC AUTO-ENTMCNC: 33.2 G/DL (ref 31–37)
MCV RBC AUTO: 96 FL
MDMA UR QL SCN: NEGATIVE
METHADONE UR QL SCN: NEGATIVE
MONOCYTES # BLD AUTO: 1 X10(3) UL (ref 0.1–1)
MONOCYTES NFR BLD AUTO: 7.6 %
NEUTROPHILS # BLD AUTO: 10.23 X10 (3) UL (ref 1.5–7.7)
NEUTROPHILS # BLD AUTO: 10.23 X10(3) UL (ref 1.5–7.7)
NEUTROPHILS NFR BLD AUTO: 77.4 %
OSMOLALITY SERPL CALC.SUM OF ELEC: 288 MOSM/KG (ref 275–295)
OXYCODONE UR QL SCN: NEGATIVE
PCP UR QL SCN: NEGATIVE
PLATELET # BLD AUTO: 280 10(3)UL (ref 150–450)
POTASSIUM SERPL-SCNC: 3.6 MMOL/L (ref 3.5–5.1)
PROT SERPL-MCNC: 6.2 G/DL (ref 6.4–8.2)
RBC # BLD AUTO: 3.98 X10(6)UL
SODIUM SERPL-SCNC: 141 MMOL/L (ref 136–145)
TSI SER-ACNC: 1.18 MIU/ML (ref 0.36–3.74)
WBC # BLD AUTO: 13.2 X10(3) UL (ref 4–11)

## 2023-08-11 PROCEDURE — 90792 PSYCH DIAG EVAL W/MED SRVCS: CPT | Performed by: OTHER

## 2023-08-11 PROCEDURE — 99233 SBSQ HOSP IP/OBS HIGH 50: CPT | Performed by: HOSPITALIST

## 2023-08-11 RX ORDER — MIRTAZAPINE 7.5 MG/1
7.5 TABLET, FILM COATED ORAL NIGHTLY
Status: DISCONTINUED | OUTPATIENT
Start: 2023-08-11 | End: 2023-08-12

## 2023-08-11 RX ORDER — MAGNESIUM OXIDE 400 MG/1
400 TABLET ORAL ONCE
Status: COMPLETED | OUTPATIENT
Start: 2023-08-11 | End: 2023-08-11

## 2023-08-11 RX ORDER — HYDROCODONE BITARTRATE AND ACETAMINOPHEN 5; 325 MG/1; MG/1
1 TABLET ORAL EVERY 6 HOURS PRN
Status: DISCONTINUED | OUTPATIENT
Start: 2023-08-11 | End: 2023-08-12

## 2023-08-11 RX ORDER — LORAZEPAM 0.5 MG/1
0.5 TABLET ORAL EVERY 4 HOURS PRN
Status: DISCONTINUED | OUTPATIENT
Start: 2023-08-11 | End: 2023-08-12

## 2023-08-11 NOTE — PLAN OF CARE
Claudean Au is alert and oriented X4. VSS. Patient is tolerating general low-fat diet, educated patient on diet choices. IVF running. IV and PO medication given for pain management. Bed low, locked, and all safety measures in place. Frequent rounding, call light within reach. Problem: Patient Centered Care  Goal: Patient preferences are identified and integrated in the patient's plan of care  Description: Interventions:  - What would you like us to know as we care for you?  I live at home with my  and daughter.  - Provide timely, complete, and accurate information to patient/family  - Incorporate patient and family knowledge, values, beliefs, and cultural backgrounds into the planning and delivery of care  - Encourage patient/family to participate in care and decision-making at the level they choose  - Honor patient and family perspectives and choices  Outcome: Progressing     Problem: Patient/Family Goals  Goal: Patient/Family Long Term Goal  Description: Patient's Long Term Goal: To go home    Interventions:  - Monitor Labs  - Monitor VS  - Diagnostic tests as ordered  - Pain management  - See additional Care Plan goals for specific interventions  Outcome: Progressing  Goal: Patient/Family Short Term Goal  Description: Patient's Short Term Goal: To feel better    Interventions:   - Medications as ordered  - Diagnostic tests as ordered  - Monitor labs  - See additional Care Plan goals for specific interventions  Outcome: Progressing     Problem: PAIN - ADULT  Goal: Verbalizes/displays adequate comfort level or patient's stated pain goal  Description: INTERVENTIONS:  - Encourage pt to monitor pain and request assistance  - Assess pain using appropriate pain scale  - Administer analgesics based on type and severity of pain and evaluate response  - Implement non-pharmacological measures as appropriate and evaluate response  - Consider cultural and social influences on pain and pain management  - Manage/alleviate anxiety  - Utilize distraction and/or relaxation techniques  - Monitor for opioid side effects  - Notify MD/LIP if interventions unsuccessful or patient reports new pain  - Anticipate increased pain with activity and pre-medicate as appropriate  Outcome: Progressing     Problem: DISCHARGE PLANNING  Goal: Discharge to home or other facility with appropriate resources  Description: INTERVENTIONS:  - Identify barriers to discharge w/pt and caregiver  - Include patient/family/discharge partner in discharge planning  - Arrange for needed discharge resources and transportation as appropriate  - Identify discharge learning needs (meds, wound care, etc)  - Arrange for interpreters to assist at discharge as needed  - Consider post-discharge preferences of patient/family/discharge partner  - Complete POLST form as appropriate  - Assess patient's ability to be responsible for managing their own health  - Refer to Case Management Department for coordinating discharge planning if the patient needs post-hospital services based on physician/LIP order or complex needs related to functional status, cognitive ability or social support system  Outcome: Progressing     Problem: GASTROINTESTINAL - ADULT  Goal: Maintains adequate nutritional intake (undernourished)  Description: INTERVENTIONS:  - Monitor percentage of each meal consumed  - Identify factors contributing to decreased intake, treat as appropriate  - Assist with meals as needed  - Monitor I&O, WT and lab values  - Obtain nutritional consult as needed  - Optimize oral hygiene and moisture  - Encourage food from home; allow for food preferences  - Enhance eating environment  Outcome: Progressing

## 2023-08-11 NOTE — CM/SW NOTE
Received MDO for PHQ4. Patient discussed in rounds, Psych/psych liaison on consult to address [address] & ETOH abuse. No discharge needs identified at this time.     Avtar Cottrell, [address]

## 2023-08-11 NOTE — PLAN OF CARE
Problem: Patient Centered Care  Goal: Patient preferences are identified and integrated in the patient's plan of care  Description: Interventions:  - What would you like us to know as we care for you?  I live at home with my  and daughter.  - Provide timely, complete, and accurate information to patient/family  - Incorporate patient and family knowledge, values, beliefs, and cultural backgrounds into the planning and delivery of care  - Encourage patient/family to participate in care and decision-making at the level they choose  - Honor patient and family perspectives and choices  Outcome: Progressing     Problem: Patient/Family Goals  Goal: Patient/Family Long Term Goal  Description: Patient's Long Term Goal: To go home    Interventions:  - Monitor Labs  - Monitor VS  - Diagnostic tests as ordered  - Pain management  - See additional Care Plan goals for specific interventions  Outcome: Progressing  Goal: Patient/Family Short Term Goal  Description: Patient's Short Term Goal: To feel better    Interventions:   - Medications as ordered  - Diagnostic tests as ordered  - Monitor labs  - See additional Care Plan goals for specific interventions  Outcome: Progressing     Problem: PAIN - ADULT  Goal: Verbalizes/displays adequate comfort level or patient's stated pain goal  Description: INTERVENTIONS:  - Encourage pt to monitor pain and request assistance  - Assess pain using appropriate pain scale  - Administer analgesics based on type and severity of pain and evaluate response  - Implement non-pharmacological measures as appropriate and evaluate response  - Consider cultural and social influences on pain and pain management  - Manage/alleviate anxiety  - Utilize distraction and/or relaxation techniques  - Monitor for opioid side effects  - Notify MD/LIP if interventions unsuccessful or patient reports new pain  - Anticipate increased pain with activity and pre-medicate as appropriate  Outcome: Progressing   IV pain medication PRN for abd pain. IVF. GI on con.

## 2023-08-11 NOTE — PLAN OF CARE
Pt admitted to unit this afternoon from ED. Pain managed with prn morphine and prn Zofran given for nausea. Safety and fall precautions maintained. Call light within reach. Frequent rounding by nursing staff. Problem: Patient Centered Care  Goal: Patient preferences are identified and integrated in the patient's plan of care  Description: Interventions:  - What would you like us to know as we care for you?  I live at home with my  and daughter.  - Provide timely, complete, and accurate information to patient/family  - Incorporate patient and family knowledge, values, beliefs, and cultural backgrounds into the planning and delivery of care  - Encourage patient/family to participate in care and decision-making at the level they choose  - Honor patient and family perspectives and choices  8/10/2023 1936 by Josh Maravilla RN  Outcome: Progressing  8/10/2023 1936 by Josh Maravilla RN  Outcome: Progressing     Problem: Patient/Family Goals  Goal: Patient/Family Long Term Goal  Description: Patient's Long Term Goal: To go home    Interventions:  - Monitor Labs  - Monitor VS  - Diagnostic tests as ordered  - Pain management  - See additional Care Plan goals for specific interventions  8/10/2023 1936 by Josh Maravilla RN  Outcome: Progressing  8/10/2023 1936 by Josh Maravilla RN  Outcome: Progressing  Goal: Patient/Family Short Term Goal  Description: Patient's Short Term Goal: To feel better    Interventions:   - Medications as ordered  - Diagnostic tests as ordered  - Monitor labs  - See additional Care Plan goals for specific interventions  8/10/2023 1936 by Josh Maravilla RN  Outcome: Progressing  8/10/2023 1936 by Josh Maravilla RN  Outcome: Progressing     Problem: PAIN - ADULT  Goal: Verbalizes/displays adequate comfort level or patient's stated pain goal  Description: INTERVENTIONS:  - Encourage pt to monitor pain and request assistance  - Assess pain using appropriate pain scale  - Administer analgesics based on type and severity of pain and evaluate response  - Implement non-pharmacological measures as appropriate and evaluate response  - Consider cultural and social influences on pain and pain management  - Manage/alleviate anxiety  - Utilize distraction and/or relaxation techniques  - Monitor for opioid side effects  - Notify MD/LIP if interventions unsuccessful or patient reports new pain  - Anticipate increased pain with activity and pre-medicate as appropriate  8/10/2023 1936 by Skye Huerta RN  Outcome: Progressing  8/10/2023 1936 by Skye Huerta RN  Outcome: Progressing

## 2023-08-12 VITALS
HEART RATE: 73 BPM | BODY MASS INDEX: 24.1 KG/M2 | SYSTOLIC BLOOD PRESSURE: 111 MMHG | DIASTOLIC BLOOD PRESSURE: 67 MMHG | RESPIRATION RATE: 18 BRPM | TEMPERATURE: 98 F | OXYGEN SATURATION: 98 % | HEIGHT: 64 IN | WEIGHT: 141.13 LBS

## 2023-08-12 LAB
ANION GAP SERPL CALC-SCNC: 1 MMOL/L (ref 0–18)
BASOPHILS # BLD AUTO: 0.06 X10(3) UL (ref 0–0.2)
BASOPHILS NFR BLD AUTO: 0.7 %
BUN BLD-MCNC: 5 MG/DL (ref 7–18)
BUN/CREAT SERPL: 8.5 (ref 10–20)
CALCIUM BLD-MCNC: 8.3 MG/DL (ref 8.5–10.1)
CHLORIDE SERPL-SCNC: 111 MMOL/L (ref 98–112)
CO2 SERPL-SCNC: 32 MMOL/L (ref 21–32)
CREAT BLD-MCNC: 0.59 MG/DL
DEPRECATED RDW RBC AUTO: 42.2 FL (ref 35.1–46.3)
EGFRCR SERPLBLD CKD-EPI 2021: 118 ML/MIN/1.73M2 (ref 60–?)
EOSINOPHIL # BLD AUTO: 0.38 X10(3) UL (ref 0–0.7)
EOSINOPHIL NFR BLD AUTO: 4.3 %
ERYTHROCYTE [DISTWIDTH] IN BLOOD BY AUTOMATED COUNT: 12 % (ref 11–15)
GLUCOSE BLD-MCNC: 98 MG/DL (ref 70–99)
HCT VFR BLD AUTO: 35.5 %
HGB BLD-MCNC: 12 G/DL
IMM GRANULOCYTES # BLD AUTO: 0.02 X10(3) UL (ref 0–1)
IMM GRANULOCYTES NFR BLD: 0.2 %
LYMPHOCYTES # BLD AUTO: 1.98 X10(3) UL (ref 1–4)
LYMPHOCYTES NFR BLD AUTO: 22.3 %
MAGNESIUM SERPL-MCNC: 1.9 MG/DL (ref 1.6–2.6)
MCH RBC QN AUTO: 32 PG (ref 26–34)
MCHC RBC AUTO-ENTMCNC: 33.8 G/DL (ref 31–37)
MCV RBC AUTO: 94.7 FL
MONOCYTES # BLD AUTO: 0.81 X10(3) UL (ref 0.1–1)
MONOCYTES NFR BLD AUTO: 9.1 %
NEUTROPHILS # BLD AUTO: 5.62 X10 (3) UL (ref 1.5–7.7)
NEUTROPHILS # BLD AUTO: 5.62 X10(3) UL (ref 1.5–7.7)
NEUTROPHILS NFR BLD AUTO: 63.4 %
OSMOLALITY SERPL CALC.SUM OF ELEC: 295 MOSM/KG (ref 275–295)
PLATELET # BLD AUTO: 264 10(3)UL (ref 150–450)
POTASSIUM SERPL-SCNC: 3.7 MMOL/L (ref 3.5–5.1)
RBC # BLD AUTO: 3.75 X10(6)UL
SODIUM SERPL-SCNC: 144 MMOL/L (ref 136–145)
WBC # BLD AUTO: 8.9 X10(3) UL (ref 4–11)

## 2023-08-12 PROCEDURE — 99233 SBSQ HOSP IP/OBS HIGH 50: CPT | Performed by: OTHER

## 2023-08-12 PROCEDURE — 99239 HOSP IP/OBS DSCHRG MGMT >30: CPT | Performed by: HOSPITALIST

## 2023-08-12 RX ORDER — POLYETHYLENE GLYCOL 3350 17 G/17G
17 POWDER, FOR SOLUTION ORAL DAILY
Status: DISCONTINUED | OUTPATIENT
Start: 2023-08-12 | End: 2023-08-12

## 2023-08-12 RX ORDER — MIRTAZAPINE 15 MG/1
15 TABLET, FILM COATED ORAL NIGHTLY
Status: DISCONTINUED | OUTPATIENT
Start: 2023-08-12 | End: 2023-08-12

## 2023-08-12 RX ORDER — DOCUSATE SODIUM 100 MG/1
100 CAPSULE, LIQUID FILLED ORAL 2 TIMES DAILY
Status: DISCONTINUED | OUTPATIENT
Start: 2023-08-12 | End: 2023-08-12

## 2023-08-12 RX ORDER — MIRTAZAPINE 15 MG/1
15 TABLET, FILM COATED ORAL NIGHTLY
Qty: 30 TABLET | Refills: 0 | Status: SHIPPED | OUTPATIENT
Start: 2023-08-12

## 2023-08-12 RX ORDER — MIRTAZAPINE 7.5 MG/1
7.5 TABLET, FILM COATED ORAL NIGHTLY
Qty: 30 TABLET | Refills: 0 | Status: SHIPPED | OUTPATIENT
Start: 2023-08-12 | End: 2023-08-12

## 2023-08-12 NOTE — DISCHARGE SUMMARY
[facility]   Patient ID:  Carlita South  [id number]  3 year old  2/10/1985    Admit date: 8/10/2023  Discharge date: 8/12/2023  Primary Care Physician: Chay Lazo   Attending Physician: Andrew Waldrop MD   Consults:   Consultants         Provider   Role Specialty     Margaret Velazco MD  Consulting Physician Pilar Bassett MD  Consulting Physician Psychiatry            Discharge Diagnoses:   Acute pancreatitis, unspecified complication status, unspecified pancreatitis type    Reason for admission  Copied from admission H&P: The patient is a 70-year-old  female who presented to the emergency department for evaluation of intense mid abdominal pain for the last 3 days. CBC showed white blood cell count of 18.1 with left shift. Chemistry and liver function tests were unremarkable. Triglycerides 142. Lipase 816. CT scan of the abdomen showed acute pancreatitis without peripancreatic collection or pancreatic ductal dilation. The patient was started on IV fluids and pain medication. She will be admitted to the hospital for further management. Hospital Course:    # Acute alcoholic pancreatitis   - resolved. - GI on consult. - diet per GI - advanced to low fat. - Needs to abstain from drinking ETOH   - Morphine IV for severe pain, Add norco PRN   - stop IVF. - PRN zofran. - Lipase 816 on admit --> 141. Clinically improving   - WBC trending down. Afebrile.   - ETOH level negative. - CT reviewed. - tolerating low fat diet. # ETOH abuse   - will provide resources. - Used to drink hard liquor now drinks 3-4x/week last drink Saturday. Usually drinks 12 beers on weekdays weekends up to 20 beers at a time. # Major depression  - Psych consult. - Pt with feelings of helplessness, guilt, unable to sleep, low mood.  Still enjoys gardening.   - started on mirtazapine.   - outpt fu with psych        EXAM:   GENERAL: no apparent distress, comfortable  NEURO: A/A Ox3, no focal deficits  RESP: non labored, CTAB/L  CARDIO: Regular, no murmur  ABD: soft, NT, ND  EXTREMITIES: no edema, no calf tenderness    Operative Procedures:     Discharge Instructions     Medication List        START taking these medications      mirtazapine 7.5 MG Tabs  Commonly known as: Remeron  Take 1 tablet (7.5 mg total) by mouth nightly. Where to Get Your Medications        These medications were sent to [address] [phone], [phone]  [address] [place] [address]      Phone: [phone]   mirtazapine 7.5 MG Tabs         Activity: activity as tolerated  Diet: regular diet  Wound Care: NA  Code Status: No Order          Important follow up: Follow-up Information       Isaias Gonzales Follow up in 1 week(s). Specialty: Internal Medicine  Contact information:  [address]  [phone]                             -PCP in [] within 7 days [] within 14 days [] other     Disposition: home  Discharged Condition: good    Hospital Discharge Diagnoses:  acute pancreatitis    Lace+ Score: 24  59-90 High Risk  29-58 Medium Risk  0-28   Low Risk. TCM Follow-Up Recommendation:  LACE > 58:  High Risk of readmission after discharge from the hospital.            Total Time Coordinating Care: Greater than 30 minutes    Patient had opportunity to ask questions, state understanding, and agree with therapeutic plan as outlined    Suleman Wheatley MD  Hospitalist  8/12/2023

## 2023-08-12 NOTE — PLAN OF CARE
Problem: Patient Centered Care  Goal: Patient preferences are identified and integrated in the patient's plan of care  Description: Interventions:  - What would you like us to know as we care for you?  I live at home with my  and daughter.  - Provide timely, complete, and accurate information to patient/family  - Incorporate patient and family knowledge, values, beliefs, and cultural backgrounds into the planning and delivery of care  - Encourage patient/family to participate in care and decision-making at the level they choose  - Honor patient and family perspectives and choices  Outcome: Progressing     Problem: Patient/Family Goals  Goal: Patient/Family Long Term Goal  Description: Patient's Long Term Goal: To go home    Interventions:  - Monitor Labs  - Monitor VS  - Diagnostic tests as ordered  - Pain management  - See additional Care Plan goals for specific interventions  Outcome: Progressing  Goal: Patient/Family Short Term Goal  Description: Patient's Short Term Goal: To feel better    Interventions:   - Medications as ordered  - Diagnostic tests as ordered  - Monitor labs  - See additional Care Plan goals for specific interventions  Outcome: Progressing     Problem: PAIN - ADULT  Goal: Verbalizes/displays adequate comfort level or patient's stated pain goal  Description: INTERVENTIONS:  - Encourage pt to monitor pain and request assistance  - Assess pain using appropriate pain scale  - Administer analgesics based on type and severity of pain and evaluate response  - Implement non-pharmacological measures as appropriate and evaluate response  - Consider cultural and social influences on pain and pain management  - Manage/alleviate anxiety  - Utilize distraction and/or relaxation techniques  - Monitor for opioid side effects  - Notify MD/LIP if interventions unsuccessful or patient reports new pain  - Anticipate increased pain with activity and pre-medicate as appropriate  Outcome: Progressing Problem: DISCHARGE PLANNING  Goal: Discharge to home or other facility with appropriate resources  Description: INTERVENTIONS:  - Identify barriers to discharge w/pt and caregiver  - Include patient/family/discharge partner in discharge planning  - Arrange for needed discharge resources and transportation as appropriate  - Identify discharge learning needs (meds, wound care, etc)  - Arrange for interpreters to assist at discharge as needed  - Consider post-discharge preferences of patient/family/discharge partner  - Complete POLST form as appropriate  - Assess patient's ability to be responsible for managing their own health  - Refer to Case Management Department for coordinating discharge planning if the patient needs post-hospital services based on physician/LIP order or complex needs related to functional status, cognitive ability or social support system  Outcome: Progressing     Problem: GASTROINTESTINAL - ADULT  Goal: Maintains adequate nutritional intake (undernourished)  Description: INTERVENTIONS:  - Monitor percentage of each meal consumed  - Identify factors contributing to decreased intake, treat as appropriate  - Assist with meals as needed  - Monitor I&O, WT and lab values  - Obtain nutritional consult as needed  - Optimize oral hygiene and moisture  - Encourage food from home; allow for food preferences  - Enhance eating environment  Outcome: Progressing     Patient is presently resting in the bed. Alert x 4. Vital signs taken and stable. Tolerates diet well. Self care and independent. Patient ambulates in room and hallway without any problems. Patient is medicated as needed for pain or discomfort. Patient medicated for constipation with colace and miralax as scheduled per doctor orders. Patient will be discharge home today with family.

## 2023-08-12 NOTE — CM/SW NOTE
08/12/23 0900   Discharge disposition   Expected discharge disposition Home or Self   Discharge transportation Private car     Pt received MDO for discharge planning. Pt is self, NN at this time. SW/CM to remain available for support and/or discharge planning.      Amandeep Farnsworth, MSW, LSW   x [phone]

## 2023-08-12 NOTE — PLAN OF CARE
Problem: Patient Centered Care  Goal: Patient preferences are identified and integrated in the patient's plan of care  Description: Interventions:  - What would you like us to know as we care for you?  I live at home with my  and daughter.  - Provide timely, complete, and accurate information to patient/family  - Incorporate patient and family knowledge, values, beliefs, and cultural backgrounds into the planning and delivery of care  - Encourage patient/family to participate in care and decision-making at the level they choose  - Honor patient and family perspectives and choices  Outcome: Progressing     Problem: Patient/Family Goals  Goal: Patient/Family Long Term Goal  Description: Patient's Long Term Goal: To go home    Interventions:  - Monitor Labs  - Monitor VS  - Diagnostic tests as ordered  - Pain management  - See additional Care Plan goals for specific interventions  Outcome: Progressing  Goal: Patient/Family Short Term Goal  Description: Patient's Short Term Goal: To feel better    Interventions:   - Medications as ordered  - Diagnostic tests as ordered  - Monitor labs  - See additional Care Plan goals for specific interventions  Outcome: Progressing     Problem: PAIN - ADULT  Goal: Verbalizes/displays adequate comfort level or patient's stated pain goal  Description: INTERVENTIONS:  - Encourage pt to monitor pain and request assistance  - Assess pain using appropriate pain scale  - Administer analgesics based on type and severity of pain and evaluate response  - Implement non-pharmacological measures as appropriate and evaluate response  - Consider cultural and social influences on pain and pain management  - Manage/alleviate anxiety  - Utilize distraction and/or relaxation techniques  - Monitor for opioid side effects  - Notify MD/LIP if interventions unsuccessful or patient reports new pain  - Anticipate increased pain with activity and pre-medicate as appropriate  Outcome: Progressing Problem: DISCHARGE PLANNING  Goal: Discharge to home or other facility with appropriate resources  Description: INTERVENTIONS:  - Identify barriers to discharge w/pt and caregiver  - Include patient/family/discharge partner in discharge planning  - Arrange for needed discharge resources and transportation as appropriate  - Identify discharge learning needs (meds, wound care, etc)  - Arrange for interpreters to assist at discharge as needed  - Consider post-discharge preferences of patient/family/discharge partner  - Complete POLST form as appropriate  - Assess patient's ability to be responsible for managing their own health  - Refer to Case Management Department for coordinating discharge planning if the patient needs post-hospital services based on physician/LIP order or complex needs related to functional status, cognitive ability or social support system  Outcome: Progressing     Problem: GASTROINTESTINAL - ADULT  Goal: Maintains adequate nutritional intake (undernourished)  Description: INTERVENTIONS:  - Monitor percentage of each meal consumed  - Identify factors contributing to decreased intake, treat as appropriate  - Assist with meals as needed  - Monitor I&O, WT and lab values  - Obtain nutritional consult as needed  - Optimize oral hygiene and moisture  - Encourage food from home; allow for food preferences  - Enhance eating environment  Outcome: Progressing

## 2023-08-12 NOTE — PLAN OF CARE
Discharge instructions given to patient. Patient was instructed to follow up with doctors for appointment. Patient verbalized understanding of discharge instructions. Saline lock removed.

## 2023-08-12 NOTE — DISCHARGE INSTRUCTIONS
(1) Follow up with primary care doctor for appointment in 1 week. (2) Follow up with psychiatrist for appointment as directed. (3) Please take all medications as directed by the doctor. (4) Notify doctor immediately for fever, chills, vomiting or inability to tolerate meals or chronic pain.

## 2023-08-14 ENCOUNTER — TELEPHONE (OUTPATIENT)
Dept: INTERNAL MEDICINE UNIT | Facility: HOSPITAL | Age: 38
End: 2023-08-14

## 2023-08-14 NOTE — TELEPHONE ENCOUNTER
Call received from patient stating she was not able to schedule an apt with Dr Mary Mejia as he is not accepting new patients at this time. Discussed w Dr Tran Campos. Message left by MD to Dr Courtney Villalta to see if accommodations could be made. Call also made to intake dept for assistance with alternative referrals Intake dept to call back with additional referral options.

## 2023-08-14 NOTE — PAYOR COMM NOTE
--------------  DISCHARGE REVIEW    Payor: [address]  Subscriber #:  [id number]  Authorization Number: [id number]    Admit date: 8/10/23  Admit time:  12:25 PM  Discharge Date: 8/12/2023  4:08 PM     Admitting Physician: Ziggy Gatica MD  Attending Physician:  Michelle att. providers found  Primary Care Physician: Polly Nuñez          Discharge Summary Notes        Discharge Summary signed by Kira Adams MD at 8/12/2023  9:47 AM       Author: Kira Adams MD Specialty: HOSPITALIST, Internal Medicine Author Type: Physician    Filed: 8/12/2023  9:47 AM Date of Service: 8/12/2023  9:43 AM Status: Signed    : Kira Adasm MD (Physician)           [facility] Discharge Summary   Patient ID:  Andrea Chol  [id number]  45year old  2/10/1985    Admit date: 8/10/2023  Discharge date: 8/12/2023  Primary Care Physician: Jordan Richmond   Attending Physician: Kira Adams MD   Consults:   Consultants         Provider   Role Specialty     Shawn Lipscomb MD  Consulting Physician Luiza Hermosillo MD  Consulting Physician Psychiatry            Discharge Diagnoses:   Acute pancreatitis, unspecified complication status, unspecified pancreatitis type    Reason for admission  Copied from admission H&P: The patient is a 51-year-old  female who presented to the emergency department for evaluation of intense mid abdominal pain for the last 3 days. CBC showed white blood cell count of 18.1 with left shift. Chemistry and liver function tests were unremarkable. Triglycerides 142. Lipase 816. CT scan of the abdomen showed acute pancreatitis without peripancreatic collection or pancreatic ductal dilation. The patient was started on IV fluids and pain medication. She will be admitted to the hospital for further management. Hospital Course:    # Acute alcoholic pancreatitis   - resolved. - GI on consult. - diet per GI - advanced to low fat.    - Needs to abstain from drinking ETOH   - Morphine IV for severe pain, Add norco PRN   - stop IVF. - PRN zofran. - Lipase 816 on admit --> 141. Clinically improving   - WBC trending down. Afebrile.   - ETOH level negative. - CT reviewed. - tolerating low fat diet. # ETOH abuse   - will provide resources. - Used to drink hard liquor now drinks 3-4x/week last drink Saturday. Usually drinks 12 beers on weekdays weekends up to 20 beers at a time. # Major depression  - Psych consult. - Pt with feelings of helplessness, guilt, unable to sleep, low mood. Still enjoys gardening.   - started on mirtazapine.   - outpt fu with psych        EXAM:   GENERAL: no apparent distress, comfortable  NEURO: A/A Ox3, no focal deficits  RESP: non labored, CTAB/L  CARDIO: Regular, no murmur  ABD: soft, NT, ND  EXTREMITIES: no edema, no calf tenderness    Operative Procedures:     Discharge Instructions     Medication List        START taking these medications      mirtazapine 7.5 MG Tabs  Commonly known as: Remeron  Take 1 tablet (7.5 mg total) by mouth nightly. Where to Get Your Medications        These medications were sent to [address] [phone], [phone]  [address] [place] [address] [place]      Phone: [phone]   mirtazapine 7.5 MG Tabs         Activity: activity as tolerated  Diet: regular diet  Wound Care: NA  Code Status: No Order          Important follow up: Follow-up Information       Richelle Rios Follow up in 1 week(s). Specialty: Internal Medicine  Contact information:  [address]  [phone]                             -PCP in [] within 7 days [] within 14 days [] other     Disposition: home  Discharged Condition: good    Hospital Discharge Diagnoses:  acute pancreatitis    Lace+ Score: 24  59-90 High Risk  29-58 Medium Risk  0-28   Low Risk. TCM Follow-Up Recommendation:  LACE > 58:  High Risk of readmission after discharge from the hospital.            Total Time Coordinating Care: Greater than 30 minutes    Patient had opportunity to ask questions, state understanding, and agree with therapeutic plan as outlined    Destinee Ham MD  Hospitalist  8/12/2023    Electronically signed by Tessa Sinclair MD on 8/12/2023  9:47 AM         REVIEWER COMMENTS

## 2023-08-15 NOTE — TELEPHONE ENCOUNTER
Call back made to patient for follow up. No answer VM left to contact  [address] for further assistance with referrals.

## 2023-08-17 LAB
CODEINE UR: NEGATIVE
HYDROCODONE UR: NEGATIVE
HYDROMORPH UR: NEGATIVE
MORPHINE CONF UR: 569 NG/ML
MORPHINE UR: POSITIVE
OPIATES CLASS UR: POSITIVE NG/ML

## 2023-09-25 ENCOUNTER — APPOINTMENT (OUTPATIENT)
Dept: OBGYN | Age: 38
End: 2023-09-25

## 2023-10-28 ENCOUNTER — HOSPITAL ENCOUNTER (OUTPATIENT)
Age: 38
Discharge: HOME | End: 2023-10-28
Payer: COMMERCIAL

## 2023-10-28 ENCOUNTER — APPOINTMENT (OUTPATIENT)
Dept: GENERAL RADIOLOGY | Age: 38
End: 2023-10-28
Attending: PHYSICIAN ASSISTANT
Payer: COMMERCIAL

## 2023-10-28 VITALS
OXYGEN SATURATION: 100 % | HEART RATE: 101 BPM | DIASTOLIC BLOOD PRESSURE: 70 MMHG | TEMPERATURE: 98 F | RESPIRATION RATE: 16 BRPM | SYSTOLIC BLOOD PRESSURE: 128 MMHG

## 2023-10-28 DIAGNOSIS — R05.1 ACUTE COUGH: Primary | ICD-10-CM

## 2023-10-28 LAB — B-HCG UR QL: NEGATIVE

## 2023-10-28 PROCEDURE — 71046 X-RAY EXAM CHEST 2 VIEWS: CPT | Performed by: PHYSICIAN ASSISTANT

## 2023-10-28 PROCEDURE — 99213 OFFICE O/P EST LOW 20 MIN: CPT | Performed by: PHYSICIAN ASSISTANT

## 2023-10-28 PROCEDURE — A9150 MISC/EXPER NON-PRESCRIPT DRU: HCPCS | Performed by: PHYSICIAN ASSISTANT

## 2023-10-28 PROCEDURE — 81025 URINE PREGNANCY TEST: CPT | Performed by: PHYSICIAN ASSISTANT

## 2023-10-28 RX ORDER — ACETAMINOPHEN 500 MG
1000 TABLET ORAL ONCE
Status: COMPLETED | OUTPATIENT
Start: 2023-10-28 | End: 2023-10-28

## 2023-10-28 NOTE — DISCHARGE INSTRUCTIONS
Today you were diagnosed with a viral illness. Take antihistamines and decongestants for sinus congestion and postnasal drip cough. At nighttime take 1 to 2 tablets, 25 mg to 50 mg tablets of diphenhydramine (Benadryl) to help with sinus congestion and cough. You may additionally take 1 tablet of Claritin or Zyrtec during the day to help with sinus congestion. For added decongestion relief, you may additionally take pseudoephedrine (Sudafed). Take Tylenol and ibuprofen as needed for pain. Seek prompt medical reevaluation if you begin to have worsening pain, difficulty swallowing, drooling, shortness of breath or chest pain.

## 2025-03-23 ENCOUNTER — APPOINTMENT (OUTPATIENT)
Dept: ULTRASOUND IMAGING | Facility: HOSPITAL | Age: 40
End: 2025-03-23
Attending: EMERGENCY MEDICINE
Payer: COMMERCIAL

## 2025-03-23 PROCEDURE — 76705 ECHO EXAM OF ABDOMEN: CPT | Performed by: EMERGENCY MEDICINE

## 2025-03-24 PROBLEM — F41.1 GENERALIZED ANXIETY DISORDER: Status: ACTIVE | Noted: 2025-03-24

## 2025-03-26 ENCOUNTER — APPOINTMENT (OUTPATIENT)
Dept: CT IMAGING | Facility: HOSPITAL | Age: 40
End: 2025-03-26
Attending: INTERNAL MEDICINE
Payer: COMMERCIAL

## 2025-03-26 PROCEDURE — 74178 CT ABD&PLV WO CNTR FLWD CNTR: CPT | Performed by: INTERNAL MEDICINE

## 2025-08-12 ENCOUNTER — HOSPITAL ENCOUNTER (OUTPATIENT)
Age: 40
Discharge: HOME | End: 2025-08-12

## 2025-08-12 VITALS
HEART RATE: 98 BPM | OXYGEN SATURATION: 100 % | SYSTOLIC BLOOD PRESSURE: 133 MMHG | DIASTOLIC BLOOD PRESSURE: 76 MMHG | RESPIRATION RATE: 18 BRPM | TEMPERATURE: 98 F

## 2025-08-12 DIAGNOSIS — R21 RASH: Primary | ICD-10-CM

## 2025-08-12 PROCEDURE — 99213 OFFICE O/P EST LOW 20 MIN: CPT | Performed by: PHYSICIAN ASSISTANT
